# Patient Record
Sex: MALE | URBAN - METROPOLITAN AREA
[De-identification: names, ages, dates, MRNs, and addresses within clinical notes are randomized per-mention and may not be internally consistent; named-entity substitution may affect disease eponyms.]

---
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
## 2024-02-07 RX ORDER — KETOCONAZOLE 20 MG/ML
SHAMPOO, SUSPENSION TOPICAL
Qty: 120 | Refills: 3 | Status: ERX

## 2024-05-22 ENCOUNTER — APPOINTMENT (OUTPATIENT)
Dept: URBAN - METROPOLITAN AREA CLINIC 310 | Age: 19
Setting detail: DERMATOLOGY
End: 2024-05-22

## 2024-05-22 DIAGNOSIS — L21.8 OTHER SEBORRHEIC DERMATITIS: ICD-10-CM

## 2024-05-22 DIAGNOSIS — L988 OTHER SPECIFIED DISORDERS OF SKIN: ICD-10-CM

## 2024-05-22 DIAGNOSIS — L72.0 EPIDERMAL CYST: ICD-10-CM

## 2024-05-22 DIAGNOSIS — L942 OTHER SPECIFIED DISORDERS OF SKIN: ICD-10-CM

## 2024-05-22 PROBLEM — D29.0 BENIGN NEOPLASM OF PENIS: Status: ACTIVE | Noted: 2024-05-22

## 2024-05-22 PROCEDURE — OTHER REASSURANCE: OTHER

## 2024-05-22 PROCEDURE — OTHER COUNSELING: OTHER

## 2024-05-22 PROCEDURE — OTHER PRESCRIPTION MEDICATION MANAGEMENT: OTHER

## 2024-05-22 PROCEDURE — OTHER TREATMENT REGIMEN: OTHER

## 2024-05-22 PROCEDURE — OTHER MIPS QUALITY: OTHER

## 2024-05-22 PROCEDURE — 99213 OFFICE O/P EST LOW 20 MIN: CPT

## 2024-05-22 PROCEDURE — OTHER PRESCRIPTION: OTHER

## 2024-05-22 RX ORDER — CLINDAMYCIN PHOSPHATE 10 MG/G
GEL TOPICAL
Qty: 60 | Refills: 2 | Status: ERX | COMMUNITY
Start: 2024-05-22

## 2024-05-22 ASSESSMENT — LOCATION DETAILED DESCRIPTION DERM
LOCATION DETAILED: LEFT SUPERIOR MEDIAL FOREHEAD
LOCATION DETAILED: LEFT DORSAL SHAFT OF PENIS

## 2024-05-22 ASSESSMENT — LOCATION ZONE DERM
LOCATION ZONE: FACE
LOCATION ZONE: PENIS

## 2024-05-22 ASSESSMENT — LOCATION SIMPLE DESCRIPTION DERM
LOCATION SIMPLE: PENIS
LOCATION SIMPLE: LEFT FOREHEAD

## 2024-05-22 NOTE — PROCEDURE: PRESCRIPTION MEDICATION MANAGEMENT
Detail Level: Zone
Render In Strict Bullet Format?: No
Initiate Treatment: Panoxyl wash- lather and wash chest, back and groin once daily.  Let sit for 30 seconds before rinsing

## 2024-11-18 ENCOUNTER — APPOINTMENT (OUTPATIENT)
Dept: URBAN - METROPOLITAN AREA CLINIC 310 | Age: 19
Setting detail: DERMATOLOGY
End: 2024-11-18

## 2024-11-18 DIAGNOSIS — L21.8 OTHER SEBORRHEIC DERMATITIS: ICD-10-CM

## 2024-11-18 PROCEDURE — OTHER COUNSELING: OTHER

## 2024-11-18 PROCEDURE — 99214 OFFICE O/P EST MOD 30 MIN: CPT

## 2024-11-18 PROCEDURE — OTHER PRESCRIPTION: OTHER

## 2024-11-18 PROCEDURE — OTHER MIPS QUALITY: OTHER

## 2024-11-18 PROCEDURE — OTHER PRESCRIPTION MEDICATION MANAGEMENT: OTHER

## 2024-11-18 RX ORDER — CICLOPIROX 10 MG/.96ML
SHAMPOO TOPICAL
Qty: 120 | Refills: 2 | Status: ERX | COMMUNITY
Start: 2024-11-18

## 2024-11-18 RX ORDER — FLUCONAZOLE 150 MG/1
TABLET ORAL
Qty: 4 | Refills: 0 | Status: ERX | COMMUNITY
Start: 2024-11-18

## 2024-11-18 RX ORDER — DOXYCYCLINE HYCLATE 20 MG
TABLET ORAL
Qty: 180 | Refills: 1 | Status: ERX | COMMUNITY
Start: 2024-11-18

## 2024-11-18 RX ORDER — ROFLUMILAST 3 MG/G
AEROSOL, FOAM TOPICAL
Qty: 60 | Refills: 6 | Status: ERX | COMMUNITY
Start: 2024-11-18

## 2024-11-18 RX ORDER — CLOBETASOL PROPIONATE 0.5 MG/ML
SOLUTION TOPICAL
Qty: 50 | Refills: 3 | Status: ERX | COMMUNITY
Start: 2024-11-18

## 2024-11-18 ASSESSMENT — LOCATION ZONE DERM: LOCATION ZONE: FACE

## 2024-11-18 ASSESSMENT — LOCATION DETAILED DESCRIPTION DERM: LOCATION DETAILED: LEFT SUPERIOR MEDIAL FOREHEAD

## 2024-11-18 ASSESSMENT — LOCATION SIMPLE DESCRIPTION DERM: LOCATION SIMPLE: LEFT FOREHEAD

## 2024-11-18 NOTE — PROCEDURE: PRESCRIPTION MEDICATION MANAGEMENT
Initiate Treatment: Zoryve 0.3 % topical foam, \\ndoxycycline hyclate 20 mg tablet,\\nfluconazole 150 mg tablet,\\nclobetasol 0.05 % scalp solution,\\nciclopirox 1 % shampoo as prescribed.
Detail Level: Zone
Plan: If patient fails to improve we will consider other alternatives.
Render In Strict Bullet Format?: No

## 2025-03-18 ENCOUNTER — APPOINTMENT (OUTPATIENT)
Dept: URBAN - METROPOLITAN AREA CLINIC 310 | Age: 20
Setting detail: DERMATOLOGY
End: 2025-03-18

## 2025-03-18 DIAGNOSIS — L21.8 OTHER SEBORRHEIC DERMATITIS: ICD-10-CM

## 2025-03-18 PROCEDURE — 99214 OFFICE O/P EST MOD 30 MIN: CPT

## 2025-03-18 PROCEDURE — OTHER PRESCRIPTION MEDICATION MANAGEMENT: OTHER

## 2025-03-18 PROCEDURE — OTHER MIPS QUALITY: OTHER

## 2025-03-18 PROCEDURE — OTHER PRESCRIPTION: OTHER

## 2025-03-18 PROCEDURE — OTHER COUNSELING: OTHER

## 2025-03-18 RX ORDER — FLUCONAZOLE 150 MG/1
TABLET ORAL
Qty: 20 | Refills: 0 | Status: ERX | COMMUNITY
Start: 2025-03-18

## 2025-03-18 ASSESSMENT — LOCATION ZONE DERM: LOCATION ZONE: FACE

## 2025-03-18 ASSESSMENT — LOCATION SIMPLE DESCRIPTION DERM: LOCATION SIMPLE: LEFT FOREHEAD

## 2025-03-18 ASSESSMENT — LOCATION DETAILED DESCRIPTION DERM: LOCATION DETAILED: LEFT SUPERIOR MEDIAL FOREHEAD

## 2025-03-18 NOTE — PROCEDURE: PRESCRIPTION MEDICATION MANAGEMENT
Detail Level: Zone
Continue Regimen: Zoryve 0.3 % topical foam as previously prescribed,\\ndoxycycline hyclate 20 mg tablet as previously prescribed,\\nfluconazole 150 mg tablet as previously prescribed,\\nclobetasol 0.05 % scalp solution as previously prescribed,\\nciclopirox 1 % shampoo as previously prescribed.
Plan: If patient fails to improve and is still having trouble we will consider Otezla.
Render In Strict Bullet Format?: No

## 2025-08-06 RX ORDER — CICLOPIROX 10 MG/.96ML
SHAMPOO TOPICAL
Qty: 120 | Refills: 2 | Status: ERX